# Patient Record
(demographics unavailable — no encounter records)

---

## 2025-05-13 NOTE — CARDIOLOGY SUMMARY
[de-identified] : (05/13/2025): ECG. Sinus rhythm at 76 bpm, no significant ST/T wave abnormalities.

## 2025-05-13 NOTE — ADDENDUM
[FreeTextEntry1] : Radha HYDE assisted in documentation on 05/13/2025   acting as a scribe for Dr. Frederick Carrillo.

## 2025-05-13 NOTE — END OF VISIT
[Time Spent: ___ minutes] : I have spent [unfilled] minutes of time on the encounter which excludes teaching and separately reported services. [FreeTextEntry3] :  All medical record entries made by my scribe were at my, Dr. Frederick Carrillo, direction and personally dictated by me. I have reviewed the chart and agree that the record accurately reflects my personal performance of the history, physical exam, assessment and plan. I have also personally directed, reviewed, and agreed with the chart.

## 2025-05-13 NOTE — DISCUSSION/SUMMARY
[EKG obtained to assist in diagnosis and management of assessed problem(s)] : EKG obtained to assist in diagnosis and management of assessed problem(s) [FreeTextEntry1] : Ms. Xiomy Gilmore is a pleasant 19-year-old woman with intermittent palpitations, occurring once every 1-2 months, with abrupt onset and offset.    Etiology of patient's symptoms is possibly related to supraventricular tachycardia. Other differentials include sinus tachycardia and POTS (postural orthostatic tachycardia syndrome).    I discussed with patient at length lifestyle modifications for the prevention of her symptoms, including hydration greater than 64 ounces per day, avoiding alcohol, avoiding caffeine, and avoiding smoking, Patient exercises 4 times a week. She goes to the gym and does weightlifting and running.    I recommend a 4-week MCOT to correlate patient's symptoms with arrhythmias.    I recommend 2D echo to assess ejection fraction and valvular heart disease.    I recommend an exercise stress test to assess for arrhythmias during exertion.     .eventf

## 2025-05-13 NOTE — HISTORY OF PRESENT ILLNESS
[FreeTextEntry1] : Intermittent palpitations, occurring once every 1-2 months, lasting a few minutes, and starting abruptly.     Patient works in [employer]. She checks her heart rate when she had symptoms and has noted her heart rate to be between 120-150 bpm. Episodes occur while sitting or while lying down and are not related to change in position. Per patient, episodes have abrupt onset and offset. Patient had no prior monitors documenting her arrhythmias. Last episode of arrhythmia was in early 05/2025.     Patient has no chest pain, no shortness of breath at rest, no dyspnea on exertion, no dizziness, no lightheadedness, and no syncope. She presents for evaluation.

## 2025-06-15 NOTE — CARDIOLOGY SUMMARY
[de-identified] : (06/10/2025): ECG. Sinus rhythm at 75 bpm, no significant ST/T wave abnormalities.   (05/13/2025): ECG. Sinus rhythm at 76 bpm, no significant ST/T wave abnormalities.   [de-identified] : MOCT pending [de-identified] : (5/15/2025) EST: 1. The ECG is negative for ischemia. 2. The patient underwent stress testing using the standard Erick protocol. _ The patient exercised for 9 min 4 sec. _ The test was stopped due to fatigue. _ The peak heart rate was 172 bpm; 86 % of predicted maximal heart rate for this patient. _ The patient achieved 10.3 METS. 3. Baseline electrocardiogram: normal sinus rhythm at a rate of 81 bpm. 4. Arrhythmias: No arrhythmia associated with stress. 5. Chest pain prior to test: no chest pain and Chest pain during test: No chest pain. 6. Normal heart rate response. 7. Normal blood pressure response. [de-identified] : (5/15/2025) TTE: 1. Left ventricular cavity is normal in size. Left ventricular systolic function is normal with an ejection fraction of 64 % by Hammond's method of disks. 2. Normal right ventricular cavity size and normal right ventricular systolic function. 3. No significant valvular disease. 4. No prior echocardiogram is available for comparison.

## 2025-06-15 NOTE — HISTORY OF PRESENT ILLNESS
[FreeTextEntry1] : Intermittent palpitations, occurring once every 1-2 months, lasting a few minutes, and starting abruptly.     Patient works in EMS. She checks her heart rate when she had symptoms and has noted her heart rate to be between 120-150 bpm. Episodes occur while sitting or while lying down and are not related to change in position. Per patient, episodes have abrupt onset and offset. Patient had no prior monitors documenting her arrhythmias. Last episode of arrhythmia was in early 05/2025.     6/10/2025: mild brief palpitations. MCOT not processed yet. Patient has no chest pain, no shortness of breath at rest, no dyspnea on exertion, no dizziness, no lightheadedness, and no syncope. She presents for evaluation.

## 2025-06-15 NOTE — DISCUSSION/SUMMARY
[EKG obtained to assist in diagnosis and management of assessed problem(s)] : EKG obtained to assist in diagnosis and management of assessed problem(s) [FreeTextEntry1] : Ms. Xiomy Gilmore is a pleasant 19-year-old woman with intermittent palpitations, occurring once every 1-2 months, with abrupt onset and offset.    Etiology of patient's symptoms is possibly related to supraventricular tachycardia. Other differentials include sinus tachycardia and POTS (postural orthostatic tachycardia syndrome).    I discussed with patient at length lifestyle modifications for the prevention of her symptoms, including hydration greater than 64 ounces per day, avoiding alcohol, avoiding caffeine, and avoiding smoking, Patient exercises 4 times a week. She goes to the gym and does weightlifting and running.    I reviewed preliminary 4-week MCOT: no SVT; likely POTS.   I recommend starting Metoprolol 12.5 mg q12h and titrate to BP and HR. If fails Metoprolol will try Corlanor. I explained to patient no driving for 48 hours after initiation of Metoprolol.  I reviewed 2D echo and EST: normal  I discussed with patient plan of care in great details. I answered all her questions to her satisfaction. Patient was pleased with the visit.  Patient will follow with me in 3 months' time. Please do not hesitate to contact me at [phone] if you have any further questions regarding this patient care.